# Patient Record
Sex: FEMALE | Race: WHITE | Employment: UNEMPLOYED | URBAN - METROPOLITAN AREA
[De-identification: names, ages, dates, MRNs, and addresses within clinical notes are randomized per-mention and may not be internally consistent; named-entity substitution may affect disease eponyms.]

---

## 2017-05-12 ENCOUNTER — OFFICE VISIT (OUTPATIENT)
Dept: SURGERY | Facility: CLINIC | Age: 57
End: 2017-05-12
Payer: COMMERCIAL

## 2017-05-12 VITALS
TEMPERATURE: 97.9 F | OXYGEN SATURATION: 96 % | HEART RATE: 65 BPM | DIASTOLIC BLOOD PRESSURE: 52 MMHG | HEIGHT: 63 IN | RESPIRATION RATE: 22 BRPM | WEIGHT: 247.3 LBS | SYSTOLIC BLOOD PRESSURE: 107 MMHG | BODY MASS INDEX: 43.82 KG/M2

## 2017-05-12 DIAGNOSIS — Z13.228 SCREENING FOR ENDOCRINE, METABOLIC AND IMMUNITY DISORDER: ICD-10-CM

## 2017-05-12 DIAGNOSIS — Z13.0 SCREENING FOR ENDOCRINE, METABOLIC AND IMMUNITY DISORDER: ICD-10-CM

## 2017-05-12 DIAGNOSIS — K21.9 GASTROESOPHAGEAL REFLUX DISEASE WITHOUT ESOPHAGITIS: ICD-10-CM

## 2017-05-12 DIAGNOSIS — G47.33 OSA (OBSTRUCTIVE SLEEP APNEA): ICD-10-CM

## 2017-05-12 DIAGNOSIS — Z13.29 SCREENING FOR ENDOCRINE, METABOLIC AND IMMUNITY DISORDER: ICD-10-CM

## 2017-05-12 DIAGNOSIS — E66.01 MORBID OBESITY WITH BMI OF 40.0-44.9, ADULT (H): ICD-10-CM

## 2017-05-12 DIAGNOSIS — E66.01 MORBID OBESITY WITH BMI OF 40.0-44.9, ADULT (H): Primary | ICD-10-CM

## 2017-05-12 DIAGNOSIS — N39.3 STRESS INCONTINENCE: ICD-10-CM

## 2017-05-12 PROCEDURE — 99205 OFFICE O/P NEW HI 60 MIN: CPT | Performed by: PHYSICIAN ASSISTANT

## 2017-05-12 PROCEDURE — 97803 MED NUTRITION INDIV SUBSEQ: CPT | Performed by: DIETITIAN, REGISTERED

## 2017-05-12 RX ORDER — OLMESARTAN MEDOXOMIL, AMLODIPINE AND HYDROCHLOROTHIAZIDE TABLET 40/10/25 MG 40; 10; 25 MG/1; MG/1; MG/1
1 TABLET ORAL DAILY
COMMUNITY

## 2017-05-12 NOTE — MR AVS SNAPSHOT
After Visit Summary   5/12/2017    Ruby Rondon    MRN: [id number]           Patient Information     Date Of Birth          1960        Visit Information        Provider Department      5/12/2017 12:30 PM Krystina Davis PA-C [facility]      Today's Diagnoses     Morbid obesity with BMI of 40.0-44.9, adult (H)    -  1    Screening for endocrine, metabolic and immunity disorder          Care Instructions    PLAN:    1. Schedule with diet for next month  2. Start working on task list          Follow-ups after your visit        Future tests that were ordered for you today     Open Future Orders        Priority Expected Expires Ordered    Vitamin D Screen Routine  11/8/2017 5/12/2017    CBC with platelets Routine  11/8/2017 5/12/2017    Comprehensive metabolic panel Routine  11/8/2017 5/12/2017    Hemoglobin A1c Routine  11/8/2017 5/12/2017            Who to contact     If you have questions or need follow up information about today's clinic visit or your schedule please contact [facility] directly at [phone].  Normal or non-critical lab and imaging results will be communicated to you by Easy Social Shophart, letter or phone within 4 business days after the clinic has received the results. If you do not hear from us within 7 days, please contact the clinic through Capicalt or phone. If you have a critical or abnormal lab result, we will notify you by phone as soon as possible.  Submit refill requests through nextsocial or call your pharmacy and they will forward the refill request to us. Please allow 3 business days for your refill to be completed.          Additional Information About Your Visit        Easy Social Shophart Information     nextsocial lets you send messages to your doctor, view your test results, renew your prescriptions, schedule appointments and more. To sign up, go to [email or web address] .  "Click on \"Log in\" on the left side of the screen, which will take you to the Welcome page. Then click on \"Sign up Now\" on the right side of the page.     You will be asked to enter the access code listed below, as well as some personal information. Please follow the directions to create your username and password.     Your access code is: [email or web address]  Expires: 8/10/2017  1:39 PM     Your access code will  in 90 days. If you need help or a new code, please call your [facility] clinic or [phone].        Care EveryWhere ID     This is your Care EveryWhere ID. This could be used by other organizations to access your [facility] medical records  [id number]        Your Vitals Were     Pulse Temperature Respirations Height Pulse Oximetry BMI (Body Mass Index)    65 97.9  F (36.6  C) 22 5' 3\" (1.6 m) 96% 43.81 kg/m2       Blood Pressure from Last 3 Encounters:   17 107/52   14 135/76   14 142/86    Weight from Last 3 Encounters:   17 247 lb 4.8 oz (112.2 kg)   14 230 lb (104.3 kg)   09 203 lb 4.8 oz (92.2 kg)              We Performed the Following     OP ROOMING NOTE TO MICHELLE          Today's Medication Changes          These changes are accurate as of: 17  1:39 PM.  If you have any questions, ask your nurse or doctor.               These medicines have changed or have updated prescriptions.        Dose/Directions    WELLBUTRIN  MG 12 hr tablet   This may have changed:  See the new instructions.   Used for:  Mild major depression (H)   Generic drug:  buPROPion        ONE TABLET TWICE DAILY   Quantity:  62   Refills:  1                Primary Care Provider Office Phone # Fax #    Mary Jane Doe [phone]       [address] [address]        Thank you!     Thank you for choosing [facility]  for your care. Our goal is always to provide you with excellent care. Hearing back from our " patients is one way we can continue to improve our services. Please take a few minutes to complete the written survey that you may receive in the mail after your visit with us. Thank you!             Your Updated Medication List - Protect others around you: Learn how to safely use, store and throw away your medicines at www.disposemymeds.org.          This list is accurate as of: 5/12/17  1:39 PM.  Always use your most recent med list.                   Brand Name Dispense Instructions for use    ALEVE 220 MG tablet   Generic drug:  naproxen sodium      Take 440 mg by mouth 2 times daily (with meals)       BENADRYL 25 MG tablet   Generic drug:  diphenhydrAMINE      Take 25 mg by mouth nightly as needed for itching or allergies       biotin 2.5 mg/mL Susp      Take by mouth daily       bisoprolol-hydrochlorothiazide 2.5-6.25 MG per tablet    ZIAC     Reported on 5/12/2017       FEMHRT 1/5 1-5 MG-MCG per tablet   Generic drug:  norethindrone-ethinyl estradiol     90    ONE DAILY       MULTIPLE VITAMIN PO      daily       omega 3 1000 MG Caps      Take 1 capsule by mouth daily       ORTHO-NOVUM 1/35 (28) 1-35 MG-MCG per tablet   Generic drug:  norethindrone-ethinyl estradiol      Take 1 tablet by mouth daily Reported on 5/12/2017       OSCAL 500/200 D-3 500-200 MG-UNIT per tablet   Generic drug:  calcium carb 1250 mg (500 mg Huslia)/vitamin D 200 unit      1 TABLET DAILY       PRILOSEC PO      Take 40 mg by mouth daily       PRISTIQ PO      Take 1 tablet by mouth daily Reported on 5/12/2017       TRIBENZOR 40-10-25 MG Tabs   Generic drug:  Olmesartan-Amlodipine-HCTZ      Take 1 tablet by mouth daily       TYLENOL 500 MG tablet   Generic drug:  acetaminophen      Take 1,000 mg by mouth every 6 hours as needed for mild pain       VITAMIN C PO      daily       vitamin E 400 UNITS Tabs      Take 400 Units by mouth daily       WELLBUTRIN  MG 12 hr tablet   Generic drug:  buPROPion     62    ONE TABLET TWICE DAILY

## 2017-05-12 NOTE — PROGRESS NOTES
Name: KIKO CESPEDES  : 1960  Gender: Female  MRN: [id number]  Age: 56  INITIAL BARIATRIC NUTRITION ASSESSMENT  REASON FOR VISIT:  KIKO CESPEDES is a 56 year old Female presents today for initial nutrition visit for bariatric surgery.  DIAGNOSIS:  Morbid Obesity.  ANTHROPOMETRICS:  Height: 63 inches  Weight: 247 lbs  BMI: 43.7 kg/m2  CURRENT SUPPLEMENTS:  1 multivitamin-no iron 2000 International Units Vitamin D  WEIGHT LOSS ATTEMPTS:  Grapefruit diet, Other, Calorie Counting  NUTRITION HISTORY:  Meals per day: 3  Snacking: No  Breakfast: Smoothie (milk, blueberries, 1/2 frozen banana) or on weekends 2 eggs, toast, sausage-within 1 hour of waking  Lunch: Turkey [place] on wheat bread-12:00-1:00  Supper: Meat/carb/vegetable bread-5:00-6:30  Snacks: Ice cream or cookies-after dinner 3-4 x per week  Drinks: Coffee, 12 oz Diet Mountain Dew   Emotional eating: No  Binge eating: No  Night eating: No  Can you afford three meals per day? Yes  ?Can you afford the $50-60 per month for vitamins? Yes  Comments: patient is from [place], [place]; her daughter-in law had surgery at the [facility]; works full time cleaning houses; patient has done 6 months of MD visits in [facility] and will have these records faxed to us  DINING OUT HISTORY:  Frequency per week: Around once a week  Location: fast food, sit-down restaurants  Type of food: Burgers / Mexican  PHYSICAL ACTIVITY:  Type: Gym membership;Swimming;Other  Frequency: 1-2x/week  Duration (min): 60  NUTRITION DIAGNOSIS:  Patient with excessive oral food/beverage intake related to intake of calorically dense food/beverages at meals and/or snacks as evidenced by BMI of 43.7  INTERVENTION:  Nutrition Prescription: Recommend nutrient/ energy modification   Goals:  Verify that calcium intake is 8617-6388 mg per day (2-3 separate doses)  Eliminate all caffeine  Increase exercise to 3-4 X per week for 60 minutes  Implementation:  Provided  written guidelines for Laparoscopic Rita-en-Y Gastric Bypass surgery.  Provided weight loss suggestions.  Explained diet changes that would occur after surgery.  Emphasized importance of diet and lifestyle modifications.  Discussed necessity for chewable multivitamin/ mineral supplements, calcium with vitamin D, vitamin B-12, vitamin D supplements.  NUTRITION MONITORING AND EVALUATION:  Verbalizes understanding of surgery diet guidelines.  Anticipated compliance: fair-good    FOLLOW UP:  Six months of structured weight loss per insurance requirements.  RD name and number provided.  TIME SPENT WITH PATIENT: 45 minutes  James Moss RD, LD  [facility]  [phone]

## 2017-05-12 NOTE — PROGRESS NOTES
INITIAL BARIATRIC EVALUATION    [facility]  [address]  [place] [address]  Phone [phone] Fax [phone]    Date: 2017  RE: KIKO CESPEDES  MR#: [id number]  : 1960    Dear Mary Jane Doe MD,   I had the pleasure of seeing your patient, KIKO CESPEDES, to evaluate her obesity and consider her for possible weight loss surgery. As you know, KIKO is 56 years old. She has been overweight from middle age and beyond. She has been morbidly obese for the last 10 years and has been unable to achieve long-term success with weight loss attempts, such as: Grapefruit diet, Calorie Counting, or Tops.   Comorbidities of obesity from her past medical history include: High blood pressure - takes a daily med, Chest pain (not cardiac - Had cardiac workup in the past couple of years. All fine), Low back pain, Obstructive sleep apnea - uses CPAP regularly.  The symptoms related to her obesity include Post menopausal, heartburn, Diarrhea, Reflux, Stress Incontinence, Family history of blood clots, Arthritis, Hip pain, Back pain, Swelling of legs, Limited mobility, Knee pain, Ankle or foot pain, Shortness of Breath with activity, Leg swelling. The following ADLs are hindered due to her weight: Lower body dressing, Not enough energy to complete routine daily tasks, Shortness of breath with little activity.    Non-Obesity Related Past Medical History:  Depression - On medication since . Feels very stable currently. Denies any history of SI or suicidal attempts.  Anxiety  Binge Eating - feels like she eats a lot for dinner  Past Surgical History:  Tubal Ligation  [place] Teeth extracted, right leg surgery    Family History:  Father- Blood clots,( had one DVT age 82 while undergoing cancer treatment) , DVT;Cancer: Cancer type: Lung  Mother- High blood pressure;High cholesterol;Obese  PGF- Diabetes  MGM- Obese  Sibling 1- Cancer;High cholesterol: Cancer type: Throat  Sibling 2- None  Sibling  3- Cancer;gall bladder problems;Obese: Cancer type: breast  Sibling 4- High cholesterol;Obese    Social History:  She is . She is self employed and works as a   Ethnicity: white/  ETOH: Socially - Drinks about 2 times per week. Will have 1 glass of wine or a couple of beers.   Illicit Drug Use: None  Tobacco Use: No  Support system: /Daughter will be available to help the patient in the early post op period.  sisters daughter friends is who the patient can count on for support throughout her weight loss journey.  Can you afford three meals per day? Yes  ?Can you afford the $50-60 per month for vitamins? Yes    A 14 point review of system shows:  Cardiovascular: Chest pain - non-cardiac  Gastrointestinal: Diarrhea, Reflux / heartburn,  Genitourinary: Stress Incontinence,  HEENT: Headaches , Missing teeth,  Hematological: Family history of blood clots,  Musculoskeletal: Ankle or foot pain, Arthritis, Back pain, Hip pain, Knee pain, Swelling of legs, Limited mobility,  Neurologic: Dizziness,  Psychological: Anxiety, Depression,  Pulmonary: Shortness of Breath with activity,  Skin: Leg swelling    Vital Signs: Ht: 63 in, Wt: 247.3 lbs., BMI: 43.81, BP: 107 /52 , Pulse: 65, RR: 22, Temp: 97.9 O2 Sat: 96%    PHYSICAL EXAMINATION:  GENERAL: Alert and oriented x3. NAD  HEENT: Normocephalic, atraumatic, EOMI, missing 1 tooth MARIAN, missing 2 teeth Right Lower Side  CARDIOVASCULAR: Regular rate and rhythm, No murmurs, rubs or gallops  RESPIRATORY: Lung sounds clear to auscultation bilaterally  GASTROINTESTINAL: Soft, Obese, Nontender  LOWER EXTREMITIES: no edema bilat  MUSCULOSKELETAL: Examination gait was normal  NEUROLOGIC: Alert and oriented x 3, no gross defect  SKIN: dry, warm to touch    In summary, KIKO is morbidly obese with a body mass index of 43.7 kg/m2 and the comorbidities stated above. She attended an informational seminar and is a candidate for Laparoscopic Rita-en-Y  Gastric Bypass. She will have to complete the following pre-requisites:    Received weight loss goal of 5 lb prior to surgery.  Bring diet visit notes from doctor - Can see if they qualify as part of 6 month insurance requirement  Provide a letter or support from primary care - template provided  A total of 6 structured dietitian weight loss visits are required due to your insurance. Please schedule these with our dietitians.  Achieve clearance from dietitian to see surgeon.  Have Dental Evaluation. - needs at least 1 tooth replaced RLS of mouth.   Have preoperative laboratory tests drawn. - lab slip provided  Psychological Evaluation with MMPI and clearance for weight loss surgery.     Today in the office we discussed gastric bypass surgery. Preoperative, perioperative, and postoperative processes, management, and follow up were addressed. Risks and benefits were outlined including the risk of death, PE, DVT, ulcer, N/V, stricture, hernia, wound infection, and vitamin deficiencies. All questions were answered. A goal sheet and support group handout were given to the patient.  Once the patient has completed the requirements set forth in paragraph one, and there are no further recommendations, she will be allowed to see the surgeon of her choice for consultation on the Laparoscopic Rita-en-Y Gastric Bypass surgery. Patient verbalizes understanding of the process to surgery and expectations for the postoperative period including the need for lifelong lifestyle changes, vitamin supplementation, and laboratory monitoring.    Thank you for allowing us to participate in her care.  Sincerely,    Krystina Davis MS, PA-C  At [facility] we are committed to providing you exceptional care. Our surgeons specialize in performing the following minimally invasive weight-loss surgeries:  Rita-en-Y gastric bypass  Laparoscopic adjustable gastric band  Sleeve gastrectomy    If you would like to review aspects of our weight loss  surgery program, please visit our website at [email or web address]. If you have any questions, please do not hesitate to contact us at [phone].

## 2017-05-12 NOTE — MR AVS SNAPSHOT
"                  MRN:[id number]                      After Visit Summary   2017    Ruby Rondon    MRN: [id number]           Visit Information        Provider Department      2017 1:30 PM Masha Lundy RD [facility]      Your next 10 appointments already scheduled     2017  9:00 AM CDT   Weight Loss Visit with Sh Wl Diet 1, RD   [facility] ([facility])    [address] [place] [id number] [phone]              MyChart Information     S[employer] Theater[employer] lets you send messages to your doctor, view your test results, renew your prescriptions, schedule appointments and more. To sign up, go to [email or web address] . Click on \"Log in\" on the left side of the screen, which will take you to the Welcome page. Then click on \"Sign up Now\" on the right side of the page.     You will be asked to enter the access code listed below, as well as some personal information. Please follow the directions to create your username and password.     Your access code is: [email or web address]  Expires: 8/10/2017  1:39 PM     Your access code will  in 90 days. If you need help or a new code, please call your [place] clinic or [phone].        Care EveryWhere ID     This is your Care EveryWhere ID. This could be used by other organizations to access your [place] medical records  [id number]        "